# Patient Record
Sex: MALE | Race: WHITE | NOT HISPANIC OR LATINO | Employment: OTHER | URBAN - METROPOLITAN AREA
[De-identification: names, ages, dates, MRNs, and addresses within clinical notes are randomized per-mention and may not be internally consistent; named-entity substitution may affect disease eponyms.]

---

## 2022-08-16 ENCOUNTER — ANCILLARY PROCEDURE (OUTPATIENT)
Dept: MRI IMAGING | Facility: CLINIC | Age: 62
End: 2022-08-16
Attending: UROLOGY
Payer: COMMERCIAL

## 2022-08-16 DIAGNOSIS — R97.20 ELEVATED PSA: ICD-10-CM

## 2022-08-16 PROCEDURE — A9585 GADOBUTROL INJECTION: HCPCS | Performed by: RADIOLOGY

## 2022-08-16 PROCEDURE — 72197 MRI PELVIS W/O & W/DYE: CPT | Performed by: RADIOLOGY

## 2022-08-16 RX ORDER — GADOBUTROL 604.72 MG/ML
7.5 INJECTION INTRAVENOUS ONCE
Status: COMPLETED | OUTPATIENT
Start: 2022-08-16 | End: 2022-08-16

## 2022-08-16 RX ADMIN — GADOBUTROL 7.5 ML: 604.72 INJECTION INTRAVENOUS at 18:43

## 2022-08-28 ENCOUNTER — HEALTH MAINTENANCE LETTER (OUTPATIENT)
Age: 62
End: 2022-08-28

## 2022-10-05 ENCOUNTER — TRANSFERRED RECORDS (OUTPATIENT)
Dept: HEALTH INFORMATION MANAGEMENT | Facility: CLINIC | Age: 62
End: 2022-10-05

## 2022-11-09 ENCOUNTER — TRANSFERRED RECORDS (OUTPATIENT)
Dept: HEALTH INFORMATION MANAGEMENT | Facility: CLINIC | Age: 62
End: 2022-11-09

## 2022-11-10 ENCOUNTER — TRANSFERRED RECORDS (OUTPATIENT)
Dept: HEALTH INFORMATION MANAGEMENT | Facility: CLINIC | Age: 62
End: 2022-11-10

## 2022-12-01 ENCOUNTER — HOSPITAL ENCOUNTER (OUTPATIENT)
Dept: CT IMAGING | Facility: CLINIC | Age: 62
Discharge: HOME | End: 2022-12-01
Attending: UROLOGY | Admitting: UROLOGY
Payer: COMMERCIAL

## 2022-12-01 ENCOUNTER — HOSPITAL ENCOUNTER (OUTPATIENT)
Dept: NUCLEAR MEDICINE | Facility: CLINIC | Age: 62
Setting detail: NUCLEAR MEDICINE
Discharge: HOME | End: 2022-12-01
Attending: UROLOGY
Payer: COMMERCIAL

## 2022-12-01 DIAGNOSIS — C61 MALIGNANT NEOPLASM OF PROSTATE (H): ICD-10-CM

## 2022-12-01 PROCEDURE — 78306 BONE IMAGING WHOLE BODY: CPT

## 2022-12-01 PROCEDURE — 250N000009 HC RX 250: Performed by: UROLOGY

## 2022-12-01 PROCEDURE — 343N000001 HC RX 343: Performed by: UROLOGY

## 2022-12-01 PROCEDURE — A9503 TC99M MEDRONATE: HCPCS | Performed by: UROLOGY

## 2022-12-01 PROCEDURE — 250N000011 HC RX IP 250 OP 636: Performed by: UROLOGY

## 2022-12-01 PROCEDURE — 74177 CT ABD & PELVIS W/CONTRAST: CPT

## 2022-12-01 RX ORDER — IOPAMIDOL 755 MG/ML
86 INJECTION, SOLUTION INTRAVASCULAR ONCE
Status: COMPLETED | OUTPATIENT
Start: 2022-12-01 | End: 2022-12-01

## 2022-12-01 RX ORDER — TC 99M MEDRONATE 20 MG/10ML
25 INJECTION, POWDER, LYOPHILIZED, FOR SOLUTION INTRAVENOUS ONCE
Status: COMPLETED | OUTPATIENT
Start: 2022-12-01 | End: 2022-12-01

## 2022-12-01 RX ADMIN — IOPAMIDOL 86 ML: 755 INJECTION, SOLUTION INTRAVENOUS at 10:25

## 2022-12-01 RX ADMIN — TC 99M MEDRONATE 25.1 MCI.: 20 INJECTION, POWDER, LYOPHILIZED, FOR SOLUTION INTRAVENOUS at 10:10

## 2022-12-01 RX ADMIN — SODIUM CHLORIDE 65 ML: 9 INJECTION, SOLUTION INTRAVENOUS at 10:24

## 2022-12-05 ENCOUNTER — TRANSFERRED RECORDS (OUTPATIENT)
Dept: HEALTH INFORMATION MANAGEMENT | Facility: CLINIC | Age: 62
End: 2022-12-05

## 2022-12-08 ENCOUNTER — TRANSCRIBE ORDERS (OUTPATIENT)
Dept: OTHER | Age: 62
End: 2022-12-08

## 2022-12-08 DIAGNOSIS — C61 PROSTATE CANCER (H): Primary | ICD-10-CM

## 2022-12-09 ENCOUNTER — PATIENT OUTREACH (OUTPATIENT)
Dept: ONCOLOGY | Facility: CLINIC | Age: 62
End: 2022-12-09

## 2022-12-09 NOTE — PROGRESS NOTES
New Patient Oncology Nurse Navigator Note     Referring provider: Self referral     Referred to (specialty): Medical Oncology    Requested provider (if applicable): Dr. De Santiago     Date Referral Received: 12/8/22     Evaluation for : prostate cancer        Clinical History (per Nurse review of records provided):      Initial prostate biopsy was approximately 5 years prior and negative.  He had biopsy on 11/9/22, With Dr. Gonzalez, [facility].  Pathology findings for [place] 7.  He is meeting with his urologist on 12/15/22, to discuss.      Records Location (Care Everywhere, Media, etc.):   [facility] - Dr. Gonzalez     Records Needed:  Per protocol  Appointment with [facility], 12/15/22 to discuss pathology findings of prostate biopsy    I called Prabhu, to discuss referral to medical oncology.  I introduced my role and reviewed what this consult visit will entail/what to expect.  He would like to see Dr. De Santiago, as he has 2 friends who recommend him.  Prabhu has not yet been given treatment recommendations from urology, but is meeting with Dr. Gonzalez on 12/15/22.   I reviewed the location and gave contact numbers including new patient scheduling and clinic phone numbers.   Prabhu, has no other questions at this time.    I forwarded on referral with scheduling instructions for the following   PLAN: 12/16/22, Dr. De Santiago, [facility], [phone], NP in person.    I transferred call to our new patient scheduling to finalize appointment.    Rosanne Kaufman, RN, BSN  Oncology New Patient Nurse Navigator   [facility]  [phone]

## 2022-12-14 NOTE — PROGRESS NOTES
[facility] Medical Oncology Note       Date of visit: December 16, 2022  New Outpatient Clinic Note        Assessment:     1. New diagnosis of unfavorable intermediate risk prostate cancer. His biopsy is a Vika Group 3, his PSA is 10-20, both of which put him in the unfavorable intermediate risk category.  Per the National Comprehensive Cancer Network recommendations, the recommendation is for definitive management.  Either radical prostatectomy or external beam radiotherapy plus androgen deprivation for 6 months are recommended.  2. We then reviewed the data from the ProTect-T trial which is a randomized control trial for patients with very similar disease as Vito ([facility] 2016).  There was no difference in progression of prostate cancer at 10 years between surgery and RT/AD.  Moreover, and the accompanying quality of life data, the patient reported outcomes solidly favor radiotherapy over surgery because of the decreased incidence  of both incontinence and impotence.  Therefore, intervention is indicated and while both have a very high likelihood of curing Amilcar of his disease, he should choose based on side effects.  3. Vito does have an appointment with a physician in [place] who claims to cure prostate cancer by injecting RNA into the body.  He already has an appointment with this person who demands money upfront before being seen.  I told Vito that this is not evidence-based and left it at that.  It is certainly not anything I would recommend for anybody I care about including friends or family.  4. Otherwise healthy 62-year-old man who certainly has a life expectancy of 20 years or more.        Plan:     1. I am referring him to the radiation oncology group at [facility].  He should be seen by either Dr. Aden Solorzano or Dr. Heather Felton.  If he does radiation there, he would likely be seen by one of the urologist there to provide a spacer as well as fiduciary markers in the prostate.  2. I am  always happy to participate in Vito's care.  He will need somebody to prescribe androgen deprivation which is something they can do at radiation oncology there.  We could also give it here as well.  I would like him to return to clinic with me in 6 months to see how everything is going.  He will take this under advisement.    Mason De Santiago MD, MSc  Associate Professor of Medicine  [facility] [employer]  [address] [address]  [phone]    __________________________________________________________________    CHIEF COMPLAINT     Vito presents today to get another opinion regarding further evaluation and management of his recently diagnosed unfavorable intermediate risk prostate cancer    History of Present Illness:     Prabhu Duran is a 62 year old man who has been getting routine PSA screening as part of health maintenance.  Back in 2019 it was 8.6 and this led to a prostate MRI done 1 year later.  I do not have the results of this but apparently it was on concerning.  In January 2021 his PSA was 10 and in June 2022 it was 15.9.    Prostate MRI was ordered on 8/16/2022. This was read out as a PIRADS 3, with the most concerning lesion being a lesion in the left anterior zone. PSA was recently redrawn again on 10/5/2022 and it came back at 21.8.      He was then referred to Doctor Javier Gonzalez of [facility] who on 11/9/2022 did have biopsies performed.    His prostate biopsies showed a Vika 4+3 equal 7 prostate cancer involving 25% of the left lateral apex, 15% involvement with a [place] 4+3 in a targeted lesion from the MRI (left anterior).  Finally a third lesion in the left lateral mid was positive for Vika 3+4 with 30% involvement.  The other cores were negative.    Bone scan and CT of the abdomen pelvis were done on 12/1/2022 and these were negative for signs of metastatic disease.  Of note the MRI did not show any obvious  "yanely involvement.    Currently, review of physical symptoms finds that Prabhu is feeling okay.  He still recovering from his biopsy though 1 month later.  He still has occasional hematuria.  He also has hematospermia.  He otherwise feels pretty well.  He is a retired  and apparently did a lot of interesting competitions in the [place].  He still has the ability to have erections and would like to preserve that.  He has no stool issues.  He has no cough or shortness of breath.  He has no new lumps or bumps.        Past Medical History:   I have reviewed this patient's past medical history   No past medical history on file.       Past Surgical History:    I have reviewed this patient's past surgical history       Social History:   Tobacco, ETOH, and rec drugs reviewed and as noted below with the following exceptions:  Prabhu was born in [place] and then moved to [place] when he was 5 years old.  He then moved to [place] at the age of 18 and graduated from [employer] in 1981.  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  He then went to [employer] where he got a bachelor's in math and also minored in physics.  Graduated in 1990.  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  He is  to Katey who he met during college.  He thought she was a cute sorority girl.  It turns out he came to her house to  her brother early in the morning prior to a trip and she said \"are you the asshole who honked brendan at 6 in the morning?\"  That began together for a long time but only  in 2008.  They have 1 daughter Eduard.  Katey's best friend is the daughter of one of my patients, Mason Ribera.  Prabhu was a  who did competition in the [employer] field.          Family History:     No family history on file.         Medications: "     Current Outpatient Medications   Medication Sig Dispense Refill     Ibuprofen (ADVIL PO) Take 600 mg by mouth every 6 hours as needed                Physical Exam:   There were no vitals taken for this visit.    ECOG PS: 0  Constitutional: WDWN male in NAD, pleasant and appropriate  Neurologic: alert, answering questions appropriately, moving all extremities spontaneously. CN 2-12 grossly intact.  Psych: appropriate affect  A complete exam was not done today due to the consultative nature of our appointment.  Data:     Component 06/24/22 01/16/21 10/29/19   Prostatic Specific Antigen 15.9 High  10.0 High  8.6 High            No results found for this or any previous visit (from the past 24 hour(s)).    Other Data     Prostate MRI 8/2022  NDINGS:  Size: 4.8 x 3.2 x 3.1 cm, 48 g   Hemorrhage: Absent  Peripheral zone: Heterogeneous on T2-weighted images. Suspicious  lesions as detailed below.  Transition zone: Enlarged with BPH changes. Transition zone nodules  which are circumscribed or mostly encapsulated without diffusion  restriction.  PI-RADS 2.  No highly suspicious nodules.     Location: Left anterior peripheral zone at the 1 o'clock position  relative to the urethra. Series 13 image 20, series 5 image 20.   Additional prostate regions involved: None  Size: 19 mm  T2 description: Mildly decreased T2 signal curvilinear shape  T2 numerical assessment: 3  DWI description: Mildly increased signal on high B value images and  mildly decreased signal on diffusion ADC map.  DWI numerical assessment: 3  DCE assessment: Negative    Prostate margin: Capsular abutment 6-15 mm with smooth contour  Lesion overall PI-RADS category: 3        Neurovascular bundles: No neurovascular bundle involvement by  malignancy.  Seminal vesicles: No seminal vesicle involvement by malignancy.  Lymph nodes: No lymph node involvement  Bones: No suspicious lesions  Other pelvic organs: No additional findings.                                                                          IMPRESSION:  1. Based on the most suspicious abnormality, this exam is  characterized as PIRADS 3 - The presence of clinically significant  cancer is equivocal.  The most suspicious lesion is a curvilinear  anterior prostate observation with short segment capsular abutment  indicating low likelihood of extraprostatic extension.  2. No suspicious adenopathy or evidence of pelvic metastases.    Labs, imaging and treatment plan reviewed with patient. All questions answered.        90 minutes spent on the date of the encounter doing chart review, review of outside records, review of test results, interpretation of tests, patient visit, documentation and discussion with family

## 2022-12-14 NOTE — PROGRESS NOTES
RECORDS STATUS - ALL OTHER DIAGNOSIS    prostate cancer  RECORDS RECEIVED FROM: [facility]   DATE RECEIVED: 12/16/2022    Action    Action Taken 12/14/2022 3:45pm KEB   I called [facility] Ph: [phone]- need records and path report. I asked to be transferred to the path dept or lab- no one at [facility] knew how to transfer to that dept. I'll try calling again tomorrow.     Radiology Dept PH: [phone]- [facility] IMG Dept- they don't have any scans on the patient.     I faxed over a request for imaging to      I called the [facility] Lab Direct: [phone] - they asked me to call back tomorrow    I faxed [facility] Records to HIM     12/15/2022 9:38AM KEB   I faxed [facility] records to HIM again     I called [facility]'s Lab again - they recommended I call [facility] Recs     I called the [facility] Recs Dept [phone]- I spoke to Tammy, she said an STAN is required for the path slides    I called pt Amilcar - he will work on an STAN for the [facility] path Dept. I sent the STAN to his [employer] account.     10AM KEB   I resolved imaging from  in PACS    Prabhu called back and confirmed he received the STAN email. He is working on the release form right now.     12/15/2022 10:08AM KEB  Pt Prabhu sent back a blank signature STAN - I called him and he will try sending the completed STAN.       NOTES STATUS DETAILS   OFFICE NOTE from referring provider  self-referred    OFFICE NOTE from medical oncologist Complete- [facility] Recs     DISCHARGE SUMMARY from hospital     DISCHARGE REPORT from the ER     OPERATIVE REPORT Complete See Prostate biopsy in EPIC   MEDICATION LIST Complete [facility]   CLINICAL TRIAL TREATMENTS TO DATE     LABS     PATHOLOGY REPORTS Requested- [facility] Prostate Biopsy   Ph: [phone]  Fax: [phone]  STAN is required   Mailing Address:   [address] [place] [address]  [employer] Tracking Number: [id number]    See Benign Prostate biopsy Report in EPIC Requested Prostate  Biopsy   November 10th 2022  Case: [id number]       3/12/2018   A.  Prostate, left base, needle core biopsy:    -Benign prostatic tissue.      B.  Prostate, left mid, needle core biopsy:    -Benign prostatic tissue.      C.  Prostate, left apex, needle core biopsy:    -Benign prostatic tissue.      D.  Prostate, right base, needle core biopsy:    -Benign Prostatic tissue.      E.  Prostate, right mid, needle core biopsy:    -Benign prostatic tissue.      F.  Prostate, right apex, needle core biopsy:    -Benign prostatic tissue.    ANYTHING RELATED TO DIAGNOSIS     GENONOMIC TESTING     TYPE:     IMAGING (NEED IMAGES & REPORT)     CT SCANS Complete CT Abdomen pelvis 12/1/2022   Xray Chest Complete- [facility]  5/21/2019    MRI Complete- [facility] MRI Prostate 8/16/2022    [facility] - MRI Prostate 1/29/2018   MAMMO     ULTRASOUND     PET

## 2022-12-16 ENCOUNTER — ONCOLOGY VISIT (OUTPATIENT)
Dept: ONCOLOGY | Facility: CLINIC | Age: 62
End: 2022-12-16
Attending: INTERNAL MEDICINE
Payer: COMMERCIAL

## 2022-12-16 ENCOUNTER — PRE VISIT (OUTPATIENT)
Dept: ONCOLOGY | Facility: CLINIC | Age: 62
End: 2022-12-16

## 2022-12-16 VITALS
HEART RATE: 60 BPM | HEIGHT: 68 IN | WEIGHT: 199.2 LBS | TEMPERATURE: 97.5 F | OXYGEN SATURATION: 98 % | SYSTOLIC BLOOD PRESSURE: 133 MMHG | DIASTOLIC BLOOD PRESSURE: 82 MMHG | BODY MASS INDEX: 30.19 KG/M2 | RESPIRATION RATE: 18 BRPM

## 2022-12-16 DIAGNOSIS — C61 PROSTATE CANCER (H): ICD-10-CM

## 2022-12-16 PROCEDURE — 99205 OFFICE O/P NEW HI 60 MIN: CPT | Performed by: INTERNAL MEDICINE

## 2022-12-16 PROCEDURE — G0463 HOSPITAL OUTPT CLINIC VISIT: HCPCS

## 2022-12-16 ASSESSMENT — PAIN SCALES - GENERAL: PAINLEVEL: NO PAIN (0)

## 2022-12-16 NOTE — NURSING NOTE
"Oncology Rooming Note    December 16, 2022 2:02 PM   Amilcar Duran is a 62 year old male who presents for:    Chief Complaint   Patient presents with     Oncology Clinic Visit     NEW - PROSTATE CANCER     Initial Vitals: /82 (BP Location: Right arm, Patient Position: Sitting, Cuff Size: Adult Regular)   Pulse 60   Temp 97.5  F (36.4  C) (Oral)   Resp 18   Ht 1.734 m (5' 8.27\")   Wt 90.4 kg (199 lb 3.2 oz)   SpO2 98%   BMI 30.05 kg/m   Estimated body mass index is 30.05 kg/m  as calculated from the following:    Height as of this encounter: 1.734 m (5' 8.27\").    Weight as of this encounter: 90.4 kg (199 lb 3.2 oz). Body surface area is 2.09 meters squared.  No Pain (0) Comment: Data Unavailable   No LMP for male patient.  Allergies reviewed: Yes  Medications reviewed: Yes    Medications: Medication refills not needed today.  Pharmacy name entered into [employer]: [id number] - [place], [place] - [address] AT [facility] [address]    Clinical concerns: New patient.      Meenakshi Ohara CMA            "

## 2022-12-16 NOTE — LETTER
12/16/2022         RE: Amilcar Duran  [address]  [place] [address]        Dear Colleague,    Thank you for referring your patient, Amilcar Duran, to the [facility]. Please see a copy of my visit note below.      [facility] Medical Oncology Note       Date of visit: December 16, 2022  New Outpatient Clinic Note        Assessment:     1. New diagnosis of unfavorable intermediate risk prostate cancer. His biopsy is a Vika Group 3, his PSA is 10-20, both of which put him in the unfavorable intermediate risk category.  Per the National Comprehensive Cancer Network recommendations, the recommendation is for definitive management.  Either radical prostatectomy or external beam radiotherapy plus androgen deprivation for 6 months are recommended.  2. We then reviewed the data from the ProTect-T trial which is a randomized control trial for patients with very similar disease as Vito ([facility] 2016).  There was no difference in progression of prostate cancer at 10 years between surgery and RT/AD.  Moreover, and the accompanying quality of life data, the patient reported outcomes solidly favor radiotherapy over surgery because of the decreased incidence  of both incontinence and impotence.  Therefore, intervention is indicated and while both have a very high likelihood of curing Amilcar of his disease, he should choose based on side effects.  3. Vito does have an appointment with a physician in [place] who claims to cure prostate cancer by injecting RNA into the body.  He already has an appointment with this person who demands money upfront before being seen.  I told Vito that this is not evidence-based and left it at that.  It is certainly not anything I would recommend for anybody I care about including friends or family.  4. Otherwise healthy 62-year-old man who certainly has a life expectancy of 20 years or more.        Plan:     1. I am referring  him to the radiation oncology group at [facility].  He should be seen by either Dr. Aden Solorzano or Dr. Heather Felton.  If he does radiation there, he would likely be seen by one of the urologist there to provide a spacer as well as fiduciary markers in the prostate.  2. I am always happy to participate in Vito's care.  He will need somebody to prescribe androgen deprivation which is something they can do at radiation oncology there.  We could also give it here as well.  I would like him to return to clinic with me in 6 months to see how everything is going.  He will take this under advisement.    Mason De Santiago MD, MSc  Associate Professor of Medicine  [facility] [employer] [place] [address]  [phone]    __________________________________________________________________    CHIEF COMPLAINT     Vito presents today to get another opinion regarding further evaluation and management of his recently diagnosed unfavorable intermediate risk prostate cancer    History of Present Illness:     Prabhu Duran is a 62 year old man who has been getting routine PSA screening as part of health maintenance.  Back in 2019 it was 8.6 and this led to a prostate MRI done 1 year later.  I do not have the results of this but apparently it was on concerning.  In January 2021 his PSA was 10 and in June 2022 it was 15.9.    Prostate MRI was ordered on 8/16/2022. This was read out as a PIRADS 3, with the most concerning lesion being a lesion in the left anterior zone. PSA was recently redrawn again on 10/5/2022 and it came back at 21.8.      He was then referred to Doctor Javier Gonzalez of [facility] who on 11/9/2022 did have biopsies performed.    His prostate biopsies showed a Vika 4+3 equal 7 prostate cancer involving 25% of the left lateral apex, 15% involvement with a [place] 4+3 in a targeted lesion from the MRI (left anterior).  Finally a third  "lesion in the left lateral mid was positive for Vika 3+4 with 30% involvement.  The other cores were negative.    Bone scan and CT of the abdomen pelvis were done on 12/1/2022 and these were negative for signs of metastatic disease.  Of note the MRI did not show any obvious yanely involvement.    Currently, review of physical symptoms finds that Prabhu is feeling okay.  He still recovering from his biopsy though 1 month later.  He still has occasional hematuria.  He also has hematospermia.  He otherwise feels pretty well.  He is a retired  and apparently did a lot of interesting competitions in the [place].  He still has the ability to have erections and would like to preserve that.  He has no stool issues.  He has no cough or shortness of breath.  He has no new lumps or bumps.        Past Medical History:   I have reviewed this patient's past medical history   No past medical history on file.       Past Surgical History:    I have reviewed this patient's past surgical history       Social History:   Tobacco, ETOH, and rec drugs reviewed and as noted below with the following exceptions:  Prabhu was born in [place] and then moved to [place] when he was 5 years old.  He then moved to [place] at the age of 18 and graduated from [employer] in 1981.  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  He then went to [employer] where he got a bachelor's in math and also minored in physics.  Graduated in 1990.  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  He is  to Katey who he met during college.  He thought she was a cute sorority girl.  It turns out he came to her house to  her brother early in the morning prior to a trip and she said \"are you the asshole who honked brendan at 6 in the morning?\"  That began together for a long time but " only  in 2008.  They have 1 daughter Eduard.  Katey's best friend is the daughter of one of my patients, Mason Ribrea.  Prabhu was a  who did competition in the [employer] field.          Family History:     No family history on file.         Medications:     Current Outpatient Medications   Medication Sig Dispense Refill     Ibuprofen (ADVIL PO) Take 600 mg by mouth every 6 hours as needed                Physical Exam:   There were no vitals taken for this visit.    ECOG PS: 0  Constitutional: WDWN male in NAD, pleasant and appropriate  Neurologic: alert, answering questions appropriately, moving all extremities spontaneously. CN 2-12 grossly intact.  Psych: appropriate affect  A complete exam was not done today due to the consultative nature of our appointment.  Data:     Component 06/24/22 01/16/21 10/29/19   Prostatic Specific Antigen 15.9 High  10.0 High  8.6 High            No results found for this or any previous visit (from the past 24 hour(s)).    Other Data     Prostate MRI 8/2022  NDINGS:  Size: 4.8 x 3.2 x 3.1 cm, 48 g   Hemorrhage: Absent  Peripheral zone: Heterogeneous on T2-weighted images. Suspicious  lesions as detailed below.  Transition zone: Enlarged with BPH changes. Transition zone nodules  which are circumscribed or mostly encapsulated without diffusion  restriction.  PI-RADS 2.  No highly suspicious nodules.     Location: Left anterior peripheral zone at the 1 o'clock position  relative to the urethra. Series 13 image 20, series 5 image 20.   Additional prostate regions involved: None  Size: 19 mm  T2 description: Mildly decreased T2 signal curvilinear shape  T2 numerical assessment: 3  DWI description: Mildly increased signal on high B value images and  mildly decreased signal on diffusion ADC map.  DWI numerical assessment: 3  DCE assessment: Negative    Prostate margin: Capsular abutment 6-15 mm with smooth contour  Lesion overall PI-RADS category:  3        Neurovascular bundles: No neurovascular bundle involvement by  malignancy.  Seminal vesicles: No seminal vesicle involvement by malignancy.  Lymph nodes: No lymph node involvement  Bones: No suspicious lesions  Other pelvic organs: No additional findings.                                                                         IMPRESSION:  1. Based on the most suspicious abnormality, this exam is  characterized as PIRADS 3 - The presence of clinically significant  cancer is equivocal.  The most suspicious lesion is a curvilinear  anterior prostate observation with short segment capsular abutment  indicating low likelihood of extraprostatic extension.  2. No suspicious adenopathy or evidence of pelvic metastases.    Labs, imaging and treatment plan reviewed with patient. All questions answered.        90 minutes spent on the date of the encounter doing chart review, review of outside records, review of test results, interpretation of tests, patient visit, documentation and discussion with family         Mason De Santiago MD

## 2022-12-27 NOTE — TELEPHONE ENCOUNTER
Records received  December 27, 2022 4:19 PM  EDITH   [facility]  [facility]   [address]  [place] [address]  Phone: [phone]   Outcome Received path: 11/10/22 Case: [id number]

## 2022-12-29 ENCOUNTER — LAB REQUISITION (OUTPATIENT)
Dept: LAB | Facility: CLINIC | Age: 62
End: 2022-12-29
Payer: COMMERCIAL

## 2022-12-29 PROCEDURE — 88321 CONSLTJ&REPRT SLD PREP ELSWR: CPT | Mod: GC | Performed by: PATHOLOGY

## 2022-12-30 LAB
PATH REPORT.COMMENTS IMP SPEC: ABNORMAL
PATH REPORT.COMMENTS IMP SPEC: ABNORMAL
PATH REPORT.COMMENTS IMP SPEC: YES
PATH REPORT.FINAL DX SPEC: ABNORMAL
PATH REPORT.GROSS SPEC: ABNORMAL
PATH REPORT.MICROSCOPIC SPEC OTHER STN: ABNORMAL
PATH REPORT.RELEVANT HX SPEC: ABNORMAL
PATH REPORT.RELEVANT HX SPEC: ABNORMAL
PATH REPORT.SITE OF ORIGIN SPEC: ABNORMAL

## 2023-09-24 ENCOUNTER — HEALTH MAINTENANCE LETTER (OUTPATIENT)
Age: 63
End: 2023-09-24

## 2023-11-15 ENCOUNTER — APPOINTMENT (OUTPATIENT)
Dept: GENERAL RADIOLOGY | Facility: CLINIC | Age: 63
End: 2023-11-15
Attending: EMERGENCY MEDICINE
Payer: COMMERCIAL

## 2023-11-15 ENCOUNTER — HOSPITAL ENCOUNTER (EMERGENCY)
Facility: CLINIC | Age: 63
Discharge: HOME | End: 2023-11-15
Attending: EMERGENCY MEDICINE | Admitting: EMERGENCY MEDICINE
Payer: COMMERCIAL

## 2023-11-15 ENCOUNTER — APPOINTMENT (OUTPATIENT)
Dept: CT IMAGING | Facility: CLINIC | Age: 63
End: 2023-11-15
Attending: STUDENT IN AN ORGANIZED HEALTH CARE EDUCATION/TRAINING PROGRAM
Payer: COMMERCIAL

## 2023-11-15 VITALS
SYSTOLIC BLOOD PRESSURE: 148 MMHG | OXYGEN SATURATION: 98 % | TEMPERATURE: 98.1 F | HEART RATE: 75 BPM | RESPIRATION RATE: 16 BRPM | DIASTOLIC BLOOD PRESSURE: 82 MMHG

## 2023-11-15 DIAGNOSIS — S09.90XA INJURY OF HEAD, INITIAL ENCOUNTER: ICD-10-CM

## 2023-11-15 DIAGNOSIS — M25.552 HIP PAIN, LEFT: ICD-10-CM

## 2023-11-15 PROCEDURE — 73502 X-RAY EXAM HIP UNI 2-3 VIEWS: CPT

## 2023-11-15 PROCEDURE — 70450 CT HEAD/BRAIN W/O DYE: CPT

## 2023-11-15 PROCEDURE — 99284 EMERGENCY DEPT VISIT MOD MDM: CPT | Mod: 25
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
## 2024-11-17 ENCOUNTER — HEALTH MAINTENANCE LETTER (OUTPATIENT)
Age: 64
End: 2024-11-17